# Patient Record
Sex: MALE | Race: WHITE | NOT HISPANIC OR LATINO | URBAN - METROPOLITAN AREA
[De-identification: names, ages, dates, MRNs, and addresses within clinical notes are randomized per-mention and may not be internally consistent; named-entity substitution may affect disease eponyms.]

---

## 2020-06-26 ENCOUNTER — OUTPATIENT (OUTPATIENT)
Dept: OUTPATIENT SERVICES | Facility: HOSPITAL | Age: 50
LOS: 1 days | Discharge: HOME | End: 2020-06-26
Payer: COMMERCIAL

## 2020-06-26 DIAGNOSIS — R10.9 UNSPECIFIED ABDOMINAL PAIN: ICD-10-CM

## 2020-06-26 PROCEDURE — 76705 ECHO EXAM OF ABDOMEN: CPT | Mod: 26

## 2020-07-01 ENCOUNTER — OUTPATIENT (OUTPATIENT)
Dept: OUTPATIENT SERVICES | Facility: HOSPITAL | Age: 50
LOS: 1 days | Discharge: HOME | End: 2020-07-01
Payer: COMMERCIAL

## 2020-07-01 DIAGNOSIS — K76.0 FATTY (CHANGE OF) LIVER, NOT ELSEWHERE CLASSIFIED: ICD-10-CM

## 2020-07-01 PROCEDURE — 74181 MRI ABDOMEN W/O CONTRAST: CPT | Mod: 26

## 2023-01-01 ENCOUNTER — EMERGENCY (EMERGENCY)
Facility: HOSPITAL | Age: 53
LOS: 0 days | End: 2023-04-16
Attending: EMERGENCY MEDICINE
Payer: SELF-PAY

## 2023-01-01 DIAGNOSIS — I46.9 CARDIAC ARREST, CAUSE UNSPECIFIED: ICD-10-CM

## 2023-01-01 DIAGNOSIS — Z86.59 PERSONAL HISTORY OF OTHER MENTAL AND BEHAVIORAL DISORDERS: ICD-10-CM

## 2023-01-01 DIAGNOSIS — Z87.19 PERSONAL HISTORY OF OTHER DISEASES OF THE DIGESTIVE SYSTEM: ICD-10-CM

## 2023-01-01 PROCEDURE — 99285 EMERGENCY DEPT VISIT HI MDM: CPT | Mod: 25

## 2023-01-01 PROCEDURE — 99053 MED SERV 10PM-8AM 24 HR FAC: CPT

## 2023-01-01 PROCEDURE — 92950 HEART/LUNG RESUSCITATION CPR: CPT

## 2023-04-16 NOTE — ED PROVIDER NOTE - PHYSICAL EXAMINATION
CONSTITUTIONAL: Unresponsive, CPR in progress with Trevor device. No external signs of trauma.  SKIN: Warm dry  HEAD: NCAT  CARD: Pulseless, asystole on monitor.  RESP: Intubated with ETCO2 confirmation, BS bl.  ABD: Distended, no signs of trauma  MSK: No spontaneous movement.  NEURO: Unresponsive.

## 2023-04-16 NOTE — ED ADULT NURSE NOTE - NS ED MD DISPO EXPIRE LIVEON
Yes
Quality 226: Preventive Care And Screening: Tobacco Use: Screening And Cessation Intervention: Patient screened for tobacco use and is an ex/non-smoker
Quality 130: Documentation Of Current Medications In The Medical Record: Current Medications Documented
Detail Level: Detailed
Quality 110: Preventive Care And Screening: Influenza Immunization: Influenza Immunization not Administered because Patient Refused.

## 2023-04-16 NOTE — ED PROVIDER NOTE - PROGRESS NOTE DETAILS
ED Attending JACOB Baez  Medical records, [place] and Live [facility] contacted. ED Attending JACOB Baez  attempted to call family, no answer ED Attending JACOB Baez  Discussed with daughter Barak blanchard patient has a history of alcohol abuse and drinks " alot" asked to quantify her mouth states 5 times a pint today, states patient just reported he did not feel well today skipped dinner when family went to go check on him at 9 PM in his room was unresponsive try to shake him and states he would not woke up which is when they called 911.  States when EMS got there patient never became responsive.  Family report do not want to donate any organs.  Aware of expiration. Barak Alicia [phone] Medical examiner office contacted.  ME case number: [id number].  [facility] [place] contacted: Case # [id number]

## 2023-04-16 NOTE — ED PROVIDER NOTE - ATTENDING CONTRIBUTION TO CARE
52-year-old male with past medical history of alcohol abuse, fatty liver, presents status post cardiac arrest.  Per EMS family called this patient was found on the floor unknown downtime, EMS reports patient was found pulseless in asystole, was given 3 epi in the field, no shocks, intubated in the field, Trevor in place, continued in asystole, ACLS was pursued in route for approximately 20 minutes.  In the emergency department patient persisted in asystole, ACLS continued with appropriate medications, tube confirmation with end-tidal CO2, NG tube placed with abdomen distended, patient persisted to be in asystole despite medications, pulseless, no cardiac activity on bedside ultrasound, no signs of right heart strain, or dilated right ventricle, patient  at 10:26 PM.    Vital Signs: I have reviewed the initial vital signs. Constitutional: Pt cyanotic, in cardiac arrest.  Integumentary: No rash. No signs of trauma. EYES: Pupils dilated and fixed. non reactive.  ENT: No signs of tongue biting.  NECK: Supple, no meningeal signs. Cardiovascular: Asystole, pulseless. Respiratory: BS present b/l with bagging pt. Gastrointestinal: No BS sounds, distended.  Musculoskeletal: No spontaneous movement. Neurologic: Intubated, in cardiac arrest.     Plan: ACLS,  at 10:26 pm.

## 2023-04-16 NOTE — ED PROVIDER NOTE - CLINICAL SUMMARY MEDICAL DECISION MAKING FREE TEXT BOX
52-year-old male with past medical history of alcohol abuse, fatty liver, presents status post cardiac arrest.  Per EMS family called this patient was found on the floor unknown downtime, EMS reports patient was found pulseless in asystole, was given 3 epi in the field, no shocks, intubated in the field, Trevor in place, continued in asystole, ACLS was pursued in route for approximately 20 minutes.  In the emergency department patient persisted in asystole, ACLS continued with appropriate medications, tube confirmation with end-tidal CO2, NG tube placed with abdomen distended, patient persisted to be in asystole despite medications, pulseless, no cardiac activity on bedside ultrasound, no signs of right heart strain, or dilated right ventricle, patient  at 10:26 PM.    Vital Signs: I have reviewed the initial vital signs. Constitutional: Pt cyanotic, in cardiac arrest.  Integumentary: No rash. No signs of trauma. EYES: Pupils dilated and fixed. non reactive.  ENT: No signs of tongue biting.  NECK: Supple, no meningeal signs. Cardiovascular: Asystole, pulseless. Respiratory: BS present b/l with bagging pt. Gastrointestinal: No BS sounds, distended.  Musculoskeletal: No spontaneous movement. Neurologic: Intubated, in cardiac arrest.     Plan: ACLS,  at 10:26 pm.    Appropriate medications for patient's presenting complaints were ordered and effects were reassessed.  Patient's records (outside records as noted in [facility]) were reviewed.  Additional history was obtained from EMS. . 52-year-old male with past medical history of alcohol abuse, fatty liver, presents status post cardiac arrest.  Per EMS family called this patient was found on the floor unknown downtime, EMS reports patient was found pulseless in asystole, was given 3 epi in the field, no shocks, intubated in the field, Trevor in place, continued in asystole, ACLS was pursued in route for approximately 20 minutes.  In the emergency department patient persisted in asystole, ACLS continued with appropriate medications, tube confirmation with end-tidal CO2, NG tube placed with abdomen distended, patient persisted to be in asystole despite medications, pulseless, no cardiac activity on bedside ultrasound, no signs of right heart strain, or dilated right ventricle, patient  at 10:26 PM.    Vital Signs: I have reviewed the initial vital signs. Constitutional: Pt cyanotic, in cardiac arrest.  Integumentary: No rash. No signs of trauma. EYES: Pupils dilated and fixed. non reactive.  ENT: No signs of tongue biting.  NECK: Supple, no meningeal signs. Cardiovascular: Asystole, pulseless. Respiratory: BS present b/l with bagging pt. Gastrointestinal: No BS sounds, distended.  Musculoskeletal: No spontaneous movement. Neurologic: Intubated, in cardiac arrest.     Plan: ACLS,  at 10:26 pm.    Appropriate medications for patient's presenting complaints were ordered and effects were reassessed.  Patient's records (outside records as noted in [facility]) were reviewed.  Additional history was obtained from EMS and family. .

## 2023-04-16 NOTE — ED PROVIDER NOTE - NSAUTOPSYCONTME_GEN_ALL_RD
[facility] Office of the Chief Medical Examiner

## 2023-04-16 NOTE — ED ADULT NURSE NOTE - OBJECTIVE STATEMENT
pt was pre notification for cardiac arrest. unknown downtime. ems worked on pt for 20 min giving 3 rounds of epi. pt arrived with Trevor in place.